# Patient Record
Sex: FEMALE | HISPANIC OR LATINO | Employment: UNEMPLOYED | URBAN - NONMETROPOLITAN AREA
[De-identification: names, ages, dates, MRNs, and addresses within clinical notes are randomized per-mention and may not be internally consistent; named-entity substitution may affect disease eponyms.]

---

## 2021-10-26 ENCOUNTER — OFFICE VISIT (OUTPATIENT)
Dept: OBSTETRICS AND GYNECOLOGY | Facility: CLINIC | Age: 31
End: 2021-10-26

## 2021-10-26 VITALS
SYSTOLIC BLOOD PRESSURE: 118 MMHG | BODY MASS INDEX: 32.79 KG/M2 | WEIGHT: 167 LBS | DIASTOLIC BLOOD PRESSURE: 64 MMHG | HEIGHT: 60 IN

## 2021-10-26 DIAGNOSIS — Z30.432 ENCOUNTER FOR REMOVAL OF INTRAUTERINE CONTRACEPTIVE DEVICE (IUD): Primary | ICD-10-CM

## 2021-10-26 PROCEDURE — 58301 REMOVE INTRAUTERINE DEVICE: CPT | Performed by: NURSE PRACTITIONER

## 2021-10-26 NOTE — PROGRESS NOTES
IUD Removal    Date of procedure:  10/26/2021    Risks and benefits discussed? Yes  Ekaterina Traylor is a 31 yr old  new Hebrew speaking gyn patient who desires IUD removal. Unsure of the type of IUD she has but it was placed 2 years ago. It is causing some pain but she also desires to conceive. States her last pap smear was 2 years ago when the IUD was placed and it was normal.  use during visit.   All questions answered? yes  Consents given by The patient  Written consent obtained? yes  Reason for removal: Desires pregnancy, pain    Local anesthesia used:  no    Procedure documentation:    A speculum was placed in order to view the cervix.  The cervix did not need to be grasped.  Cervical dilation did not need to be performed in order to access the string.  The IUD string was easily seen.  The string was grasped and the IUD was removed without difficulty.  The IUD did not appear to be adherent to the uterine cavity. It was removed intact. Pt had the Paragard IUD.     She tolerated the procedure without any difficulty.     Post procedure instructions: Patient notified to call with heavy bleeding, fever or increasing pain. Start PNVs. Discussed that Dr. Khan is available for OB care and he speaks [place].     Contraception desired: None    Follow up needed:     Diagnoses and all orders for this visit:    1. Encounter for removal of intrauterine contraceptive device (IUD) (Primary)        This note was electronically signed.    Francesca George, VISH  2021